# Patient Record
Sex: FEMALE
[De-identification: names, ages, dates, MRNs, and addresses within clinical notes are randomized per-mention and may not be internally consistent; named-entity substitution may affect disease eponyms.]

---

## 2018-04-19 ENCOUNTER — HOSPITAL ENCOUNTER (INPATIENT)
Dept: HOSPITAL 88 - ER | Age: 83
LOS: 4 days | Discharge: LEFT BEFORE DISCHARGE | DRG: 189 | End: 2018-04-23
Attending: INTERNAL MEDICINE | Admitting: INTERNAL MEDICINE
Payer: MEDICARE

## 2018-04-19 VITALS — SYSTOLIC BLOOD PRESSURE: 124 MMHG | DIASTOLIC BLOOD PRESSURE: 55 MMHG

## 2018-04-19 VITALS — WEIGHT: 192.02 LBS | HEIGHT: 61 IN | BODY MASS INDEX: 36.25 KG/M2

## 2018-04-19 VITALS — DIASTOLIC BLOOD PRESSURE: 68 MMHG | SYSTOLIC BLOOD PRESSURE: 135 MMHG

## 2018-04-19 DIAGNOSIS — T50.2X5A: ICD-10-CM

## 2018-04-19 DIAGNOSIS — K21.9: ICD-10-CM

## 2018-04-19 DIAGNOSIS — R00.1: ICD-10-CM

## 2018-04-19 DIAGNOSIS — J45.901: ICD-10-CM

## 2018-04-19 DIAGNOSIS — T48.6X5A: ICD-10-CM

## 2018-04-19 DIAGNOSIS — I25.10: ICD-10-CM

## 2018-04-19 DIAGNOSIS — I11.0: ICD-10-CM

## 2018-04-19 DIAGNOSIS — J20.9: ICD-10-CM

## 2018-04-19 DIAGNOSIS — J18.9: ICD-10-CM

## 2018-04-19 DIAGNOSIS — J44.0: ICD-10-CM

## 2018-04-19 DIAGNOSIS — J96.01: Primary | ICD-10-CM
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
## 2018-04-19 DIAGNOSIS — I48.91: ICD-10-CM

## 2018-04-19 DIAGNOSIS — E66.9: ICD-10-CM

## 2018-04-19 DIAGNOSIS — R53.1: ICD-10-CM

## 2018-04-19 DIAGNOSIS — D89.2: ICD-10-CM

## 2018-04-19 DIAGNOSIS — I50.9: ICD-10-CM

## 2018-04-19 LAB
ALBUMIN SERPL-MCNC: 3.6 G/DL (ref 3.5–5)
ALBUMIN/GLOB SERPL: 0.9 {RATIO} (ref 0.8–2)
ALP SERPL-CCNC: 126 IU/L (ref 40–150)
ALT SERPL-CCNC: 11 IU/L (ref 0–55)
ANION GAP SERPL CALC-SCNC: 14.8 MMOL/L (ref 8–16)
BACTERIA URNS QL MICRO: (no result) /HPF
BASOPHILS # BLD AUTO: 0.1 10*3/UL (ref 0–0.1)
BASOPHILS NFR BLD AUTO: 0.3 % (ref 0–1)
BILIRUB UR QL: NEGATIVE
BNP BLD-MCNC: 121.4 PG/ML (ref 0–100)
BUN SERPL-MCNC: 11 MG/DL (ref 7–26)
BUN/CREAT SERPL: 14 (ref 6–25)
CALCIUM SERPL-MCNC: 8.9 MG/DL (ref 8.4–10.2)
CHLORIDE SERPL-SCNC: 104 MMOL/L (ref 98–107)
CK MB SERPL-MCNC: 2.6 NG/ML (ref 0–5)
CK SERPL-CCNC: 124 IU/L (ref 29–168)
CLARITY UR: CLEAR
CO2 SERPL-SCNC: 24 MMOL/L (ref 22–29)
COLOR UR: YELLOW
DEPRECATED NEUTROPHILS # BLD AUTO: 15 10*3/UL (ref 2.1–6.9)
DEPRECATED RBC URNS MANUAL-ACNC: (no result) /HPF (ref 0–5)
EGFRCR SERPLBLD CKD-EPI 2021: > 60 ML/MIN (ref 60–?)
EOSINOPHIL # BLD AUTO: 0.1 10*3/UL (ref 0–0.4)
EOSINOPHIL NFR BLD AUTO: 0.6 % (ref 0–6)
EPI CELLS URNS QL MICRO: (no result) /LPF
ERYTHROCYTE [DISTWIDTH] IN CORD BLOOD: 13.2 % (ref 11.7–14.4)
GLOBULIN PLAS-MCNC: 4 G/DL (ref 2.3–3.5)
GLUCOSE SERPLBLD-MCNC: 169 MG/DL (ref 74–118)
HCT VFR BLD AUTO: 40 % (ref 34.2–44.1)
HGB BLD-MCNC: 13.1 G/DL (ref 12–16)
KETONES UR QL STRIP.AUTO: NEGATIVE
LEUKOCYTE ESTERASE UR QL STRIP.AUTO: NEGATIVE
LYMPHOCYTES # BLD: 0.7 10*3/UL (ref 1–3.2)
LYMPHOCYTES NFR BLD AUTO: 4.3 % (ref 18–39.1)
MCH RBC QN AUTO: 29.8 PG (ref 28–32)
MCHC RBC AUTO-ENTMCNC: 32.8 G/DL (ref 31–35)
MCV RBC AUTO: 91.1 FL (ref 81–99)
MONOCYTES # BLD AUTO: 0.7 10*3/UL (ref 0.2–0.8)
MONOCYTES NFR BLD AUTO: 4.3 % (ref 4.4–11.3)
NEUTS SEG NFR BLD AUTO: 89.9 % (ref 38.7–80)
NITRITE UR QL STRIP.AUTO: NEGATIVE
PLATELET # BLD AUTO: 224 X10E3/UL (ref 140–360)
POTASSIUM SERPL-SCNC: 3.8 MMOL/L (ref 3.5–5.1)
PROT UR QL STRIP.AUTO: (no result)
RBC # BLD AUTO: 4.39 X10E6/UL (ref 3.6–5.1)
SODIUM SERPL-SCNC: 139 MMOL/L (ref 136–145)
SP GR UR STRIP: 1.03 (ref 1.01–1.02)
UROBILINOGEN UR STRIP-MCNC: 0.2 MG/DL (ref 0.2–1)
WBC #/AREA URNS HPF: (no result) /HPF (ref 0–5)

## 2018-04-19 PROCEDURE — 96367 TX/PROPH/DG ADDL SEQ IV INF: CPT

## 2018-04-19 PROCEDURE — 84484 ASSAY OF TROPONIN QUANT: CPT

## 2018-04-19 PROCEDURE — 93306 TTE W/DOPPLER COMPLETE: CPT

## 2018-04-19 PROCEDURE — 83880 ASSAY OF NATRIURETIC PEPTIDE: CPT

## 2018-04-19 PROCEDURE — 71045 X-RAY EXAM CHEST 1 VIEW: CPT

## 2018-04-19 PROCEDURE — 86039 ANTINUCLEAR ANTIBODIES (ANA): CPT

## 2018-04-19 PROCEDURE — 82550 ASSAY OF CK (CPK): CPT

## 2018-04-19 PROCEDURE — 80053 COMPREHEN METABOLIC PANEL: CPT

## 2018-04-19 PROCEDURE — 82553 CREATINE MB FRACTION: CPT

## 2018-04-19 PROCEDURE — 81001 URINALYSIS AUTO W/SCOPE: CPT

## 2018-04-19 PROCEDURE — 5A09357 ASSISTANCE WITH RESPIRATORY VENTILATION, LESS THAN 24 CONSECUTIVE HOURS, CONTINUOUS POSITIVE AIRWAY PRESSURE: ICD-10-PCS | Performed by: INTERNAL MEDICINE

## 2018-04-19 PROCEDURE — 86431 RHEUMATOID FACTOR QUANT: CPT

## 2018-04-19 PROCEDURE — 36415 COLL VENOUS BLD VENIPUNCTURE: CPT

## 2018-04-19 PROCEDURE — 94640 AIRWAY INHALATION TREATMENT: CPT

## 2018-04-19 PROCEDURE — 94660 CPAP INITIATION&MGMT: CPT

## 2018-04-19 PROCEDURE — 93005 ELECTROCARDIOGRAM TRACING: CPT

## 2018-04-19 PROCEDURE — 99284 EMERGENCY DEPT VISIT MOD MDM: CPT

## 2018-04-19 PROCEDURE — 83605 ASSAY OF LACTIC ACID: CPT

## 2018-04-19 PROCEDURE — 71260 CT THORAX DX C+: CPT

## 2018-04-19 PROCEDURE — 85025 COMPLETE CBC W/AUTO DIFF WBC: CPT

## 2018-04-19 PROCEDURE — 80048 BASIC METABOLIC PNL TOTAL CA: CPT

## 2018-04-19 PROCEDURE — 83615 LACTATE (LD) (LDH) ENZYME: CPT

## 2018-04-19 PROCEDURE — 82785 ASSAY OF IGE: CPT

## 2018-04-19 RX ADMIN — Medication SCH ML: at 18:30

## 2018-04-19 RX ADMIN — SODIUM CHLORIDE SCH MLS/HR: 900 INJECTION, SOLUTION INTRAVENOUS at 23:00

## 2018-04-19 RX ADMIN — Medication SCH ML: at 22:30

## 2018-04-19 RX ADMIN — SODIUM CHLORIDE SCH GM: 9 INJECTION, SOLUTION INTRAVENOUS at 22:22

## 2018-04-19 NOTE — DIAGNOSTIC IMAGING REPORT
PROCEDURE:

A single AP view of the chest.

 

COMPARISON: None.

 

INDICATIONS:   SOB, COUGH 2 DAYS

     

FINDINGS:

Lines/tubes:  None.

 

Lungs: The lungs are well-inflated. The mild prominence of the 

perihilar interstitial markings. No consolidation.

 

Pleura:  There is no pleural effusion or pneumothorax.

 

Heart and mediastinum: Enlarged cardiac silhouette. Central pulmonary 

venous congestion.

 

Bones:  No acute bony abnormality.

 

IMPRESSION: 

 

1. enlarged cardiac silhouette with central pulmonary venous congestion 

and mild perihilar prominence, which may reflect mild interstitial 

edema. No consolidation or effusion. 

 

 

Ceferino Pollard M.D.  

Dictated by:  Ceferino Pollard M.D. on 4/19/2018 at 18:04     

Electronically approved by:  Ceferino Pollard M.D. on 4/19/2018 at 

18:04

## 2018-04-19 NOTE — XMS REPORT
Patient Summary Document

 Created on: 2018



DIVYA SINGLETON

External Reference #: [id number]

: 1929

Sex: Female



Demographics







 [place]  [address]

 

 Home Phone  [phone]

 

 Preferred Language  Unknown

 

 Marital Status  Unknown

 

 Orthodox Affiliation  Unknown

 

 Race  Unknown

 

 Additional Race(s)  

 

 Ethnic Group  Unknown





Author







 Author  [facility]

 

 [facility]

 

 Address  Unknown

 

 Phone  Unavailable







Care Team Providers







 Care Team Member Name  Role  Phone

 

 JENNY SHAW  Unavailable  Unavailable







Problems

This patient has no known problems.



Allergies, Adverse Reactions, Alerts

This patient has no known allergies or adverse reactions.



Medications

This patient has no known medications.



Results







 Test Description  Test Time  Test Comments  Text Results  Atomic Results  
Result Comments









 CHEST SINGLE (PORTABLE)            [place] [address]      Patient Name: DIVYA SINGLETON   MR #: [id number]    : 1929 Age/Sex: 88/F  Acct #: 
[id number] Req #: [id number]  Adm Physician:     Ordered by: JENNY SHAW MD  Report #: [id number]   Location: ER  Room/Bed:     ________________________
___________________________________________________________________________    
Procedure: [id number] DX/CHEST SINGLE (PORTABLE)  Exam Date:                    
         Exam Time:        REPORT STATUS: Signed    PROCEDURE:   A single AP 
view of the chest.       COMPARISON: None.       INDICATIONS:   SOB, COUGH 2 
DAYS           FINDINGS:   Lines/tubes:  None.       Lungs: The lungs are well-
inflated. The mild prominence of the    perihilar interstitial markings. No 
consolidation.       Pleura:  There is no pleural effusion or pneumothorax.    
   Heart and mediastinum: Enlarged cardiac silhouette. Central pulmonary    
venous congestion.       Bones:  No acute bony abnormality.       IMPRESSION:  
      1. enlarged cardiac silhouette with central pulmonary venous congestion  
  and mild perihilar prominence, which may reflect mild interstitial    edema. 
No consolidation or effusion.            Mitch Max M.D.     Dictated by
:  Mitch Max M.D. on 2018 at 18:04        Electronically approved 
by:  Mitch Max M.D. on 2018 at    18:04                Dictated By
: MITCH MAX MD  Electronically Signed By: MITCH MAX MD on 18 
180  Transcribed By: KEELEY on 18       COPY TO:   JENNY SHAW MD

## 2018-04-20 VITALS — SYSTOLIC BLOOD PRESSURE: 119 MMHG | DIASTOLIC BLOOD PRESSURE: 54 MMHG

## 2018-04-20 VITALS — SYSTOLIC BLOOD PRESSURE: 119 MMHG | DIASTOLIC BLOOD PRESSURE: 81 MMHG

## 2018-04-20 VITALS — SYSTOLIC BLOOD PRESSURE: 132 MMHG | DIASTOLIC BLOOD PRESSURE: 61 MMHG

## 2018-04-20 VITALS — DIASTOLIC BLOOD PRESSURE: 54 MMHG | SYSTOLIC BLOOD PRESSURE: 103 MMHG

## 2018-04-20 VITALS — DIASTOLIC BLOOD PRESSURE: 64 MMHG | SYSTOLIC BLOOD PRESSURE: 124 MMHG

## 2018-04-20 VITALS — DIASTOLIC BLOOD PRESSURE: 70 MMHG | SYSTOLIC BLOOD PRESSURE: 141 MMHG

## 2018-04-20 VITALS — DIASTOLIC BLOOD PRESSURE: 65 MMHG | SYSTOLIC BLOOD PRESSURE: 143 MMHG

## 2018-04-20 VITALS — SYSTOLIC BLOOD PRESSURE: 114 MMHG | DIASTOLIC BLOOD PRESSURE: 63 MMHG

## 2018-04-20 VITALS — SYSTOLIC BLOOD PRESSURE: 133 MMHG | DIASTOLIC BLOOD PRESSURE: 69 MMHG

## 2018-04-20 VITALS — DIASTOLIC BLOOD PRESSURE: 64 MMHG | SYSTOLIC BLOOD PRESSURE: 134 MMHG

## 2018-04-20 VITALS — DIASTOLIC BLOOD PRESSURE: 61 MMHG | SYSTOLIC BLOOD PRESSURE: 141 MMHG

## 2018-04-20 VITALS — DIASTOLIC BLOOD PRESSURE: 60 MMHG | SYSTOLIC BLOOD PRESSURE: 116 MMHG

## 2018-04-20 VITALS — DIASTOLIC BLOOD PRESSURE: 56 MMHG | SYSTOLIC BLOOD PRESSURE: 116 MMHG

## 2018-04-20 VITALS — SYSTOLIC BLOOD PRESSURE: 120 MMHG | DIASTOLIC BLOOD PRESSURE: 57 MMHG

## 2018-04-20 VITALS — DIASTOLIC BLOOD PRESSURE: 57 MMHG | SYSTOLIC BLOOD PRESSURE: 150 MMHG

## 2018-04-20 VITALS — SYSTOLIC BLOOD PRESSURE: 140 MMHG | DIASTOLIC BLOOD PRESSURE: 51 MMHG

## 2018-04-20 VITALS — DIASTOLIC BLOOD PRESSURE: 57 MMHG | SYSTOLIC BLOOD PRESSURE: 141 MMHG

## 2018-04-20 VITALS — SYSTOLIC BLOOD PRESSURE: 128 MMHG | DIASTOLIC BLOOD PRESSURE: 66 MMHG

## 2018-04-20 VITALS — DIASTOLIC BLOOD PRESSURE: 58 MMHG | SYSTOLIC BLOOD PRESSURE: 123 MMHG

## 2018-04-20 VITALS — DIASTOLIC BLOOD PRESSURE: 48 MMHG | SYSTOLIC BLOOD PRESSURE: 115 MMHG

## 2018-04-20 VITALS — DIASTOLIC BLOOD PRESSURE: 72 MMHG | SYSTOLIC BLOOD PRESSURE: 152 MMHG

## 2018-04-20 VITALS — SYSTOLIC BLOOD PRESSURE: 110 MMHG | DIASTOLIC BLOOD PRESSURE: 49 MMHG

## 2018-04-20 VITALS — SYSTOLIC BLOOD PRESSURE: 146 MMHG | DIASTOLIC BLOOD PRESSURE: 64 MMHG

## 2018-04-20 VITALS — DIASTOLIC BLOOD PRESSURE: 54 MMHG | SYSTOLIC BLOOD PRESSURE: 123 MMHG

## 2018-04-20 VITALS — SYSTOLIC BLOOD PRESSURE: 134 MMHG | DIASTOLIC BLOOD PRESSURE: 64 MMHG

## 2018-04-20 VITALS — DIASTOLIC BLOOD PRESSURE: 51 MMHG | SYSTOLIC BLOOD PRESSURE: 119 MMHG

## 2018-04-20 VITALS — DIASTOLIC BLOOD PRESSURE: 55 MMHG | SYSTOLIC BLOOD PRESSURE: 124 MMHG

## 2018-04-20 VITALS — SYSTOLIC BLOOD PRESSURE: 118 MMHG | DIASTOLIC BLOOD PRESSURE: 66 MMHG

## 2018-04-20 VITALS — DIASTOLIC BLOOD PRESSURE: 56 MMHG | SYSTOLIC BLOOD PRESSURE: 119 MMHG

## 2018-04-20 VITALS — DIASTOLIC BLOOD PRESSURE: 52 MMHG | SYSTOLIC BLOOD PRESSURE: 115 MMHG

## 2018-04-20 VITALS — DIASTOLIC BLOOD PRESSURE: 69 MMHG | SYSTOLIC BLOOD PRESSURE: 128 MMHG

## 2018-04-20 VITALS — SYSTOLIC BLOOD PRESSURE: 100 MMHG | DIASTOLIC BLOOD PRESSURE: 58 MMHG

## 2018-04-20 VITALS — SYSTOLIC BLOOD PRESSURE: 148 MMHG | DIASTOLIC BLOOD PRESSURE: 60 MMHG

## 2018-04-20 VITALS — DIASTOLIC BLOOD PRESSURE: 62 MMHG | SYSTOLIC BLOOD PRESSURE: 139 MMHG

## 2018-04-20 VITALS — SYSTOLIC BLOOD PRESSURE: 99 MMHG | DIASTOLIC BLOOD PRESSURE: 53 MMHG

## 2018-04-20 VITALS — SYSTOLIC BLOOD PRESSURE: 102 MMHG | DIASTOLIC BLOOD PRESSURE: 67 MMHG

## 2018-04-20 VITALS — SYSTOLIC BLOOD PRESSURE: 117 MMHG | DIASTOLIC BLOOD PRESSURE: 54 MMHG

## 2018-04-20 VITALS — SYSTOLIC BLOOD PRESSURE: 110 MMHG | DIASTOLIC BLOOD PRESSURE: 54 MMHG

## 2018-04-20 VITALS — DIASTOLIC BLOOD PRESSURE: 71 MMHG | SYSTOLIC BLOOD PRESSURE: 134 MMHG

## 2018-04-20 VITALS — SYSTOLIC BLOOD PRESSURE: 119 MMHG | DIASTOLIC BLOOD PRESSURE: 57 MMHG

## 2018-04-20 VITALS — DIASTOLIC BLOOD PRESSURE: 54 MMHG | SYSTOLIC BLOOD PRESSURE: 111 MMHG

## 2018-04-20 VITALS — DIASTOLIC BLOOD PRESSURE: 55 MMHG | SYSTOLIC BLOOD PRESSURE: 108 MMHG

## 2018-04-20 VITALS — DIASTOLIC BLOOD PRESSURE: 60 MMHG | SYSTOLIC BLOOD PRESSURE: 115 MMHG

## 2018-04-20 VITALS — SYSTOLIC BLOOD PRESSURE: 130 MMHG | DIASTOLIC BLOOD PRESSURE: 62 MMHG

## 2018-04-20 VITALS — DIASTOLIC BLOOD PRESSURE: 56 MMHG | SYSTOLIC BLOOD PRESSURE: 130 MMHG

## 2018-04-20 VITALS — DIASTOLIC BLOOD PRESSURE: 59 MMHG | SYSTOLIC BLOOD PRESSURE: 135 MMHG

## 2018-04-20 LAB
ANION GAP SERPL CALC-SCNC: 14.6 MMOL/L (ref 8–16)
BASOPHILS # BLD AUTO: 0 10*3/UL (ref 0–0.1)
BASOPHILS NFR BLD AUTO: 0.3 % (ref 0–1)
BUN SERPL-MCNC: 14 MG/DL (ref 7–26)
BUN/CREAT SERPL: 16 (ref 6–25)
CALCIUM SERPL-MCNC: 8.7 MG/DL (ref 8.4–10.2)
CHLORIDE SERPL-SCNC: 103 MMOL/L (ref 98–107)
CK MB SERPL-MCNC: 3.5 NG/ML (ref 0–5)
CK MB SERPL-MCNC: 4.2 NG/ML (ref 0–5)
CK SERPL-CCNC: 160 IU/L (ref 29–168)
CK SERPL-CCNC: 176 IU/L (ref 29–168)
CO2 SERPL-SCNC: 27 MMOL/L (ref 22–29)
DEPRECATED NEUTROPHILS # BLD AUTO: 9.5 10*3/UL (ref 2.1–6.9)
EGFRCR SERPLBLD CKD-EPI 2021: > 60 ML/MIN (ref 60–?)
EOSINOPHIL # BLD AUTO: 0.2 10*3/UL (ref 0–0.4)
EOSINOPHIL NFR BLD AUTO: 1.5 % (ref 0–6)
ERYTHROCYTE [DISTWIDTH] IN CORD BLOOD: 13.5 % (ref 11.7–14.4)
GLUCOSE SERPLBLD-MCNC: 141 MG/DL (ref 74–118)
HCT VFR BLD AUTO: 36.2 % (ref 34.2–44.1)
HGB BLD-MCNC: 11.9 G/DL (ref 12–16)
LYMPHOCYTES # BLD: 0.8 10*3/UL (ref 1–3.2)
LYMPHOCYTES NFR BLD AUTO: 7.2 % (ref 18–39.1)
MCH RBC QN AUTO: 30.4 PG (ref 28–32)
MCHC RBC AUTO-ENTMCNC: 32.9 G/DL (ref 31–35)
MCV RBC AUTO: 92.6 FL (ref 81–99)
MONOCYTES # BLD AUTO: 0.9 10*3/UL (ref 0.2–0.8)
MONOCYTES NFR BLD AUTO: 8.1 % (ref 4.4–11.3)
NEUTS SEG NFR BLD AUTO: 82.6 % (ref 38.7–80)
PLATELET # BLD AUTO: 217 X10E3/UL (ref 140–360)
POTASSIUM SERPL-SCNC: 3.6 MMOL/L (ref 3.5–5.1)
RBC # BLD AUTO: 3.91 X10E6/UL (ref 3.6–5.1)
SODIUM SERPL-SCNC: 141 MMOL/L (ref 136–145)

## 2018-04-20 RX ADMIN — Medication SCH ML: at 07:50

## 2018-04-20 RX ADMIN — ENOXAPARIN SODIUM SCH MG: 40 INJECTION SUBCUTANEOUS at 16:58

## 2018-04-20 RX ADMIN — AMLODIPINE BESYLATE SCH MG: 5 TABLET ORAL at 10:22

## 2018-04-20 RX ADMIN — Medication SCH ML: at 11:31

## 2018-04-20 RX ADMIN — Medication SCH ML: at 15:02

## 2018-04-20 RX ADMIN — SODIUM CHLORIDE SCH GM: 9 INJECTION, SOLUTION INTRAVENOUS at 08:58

## 2018-04-20 RX ADMIN — Medication SCH ML: at 03:33

## 2018-04-20 RX ADMIN — Medication SCH ML: at 03:32

## 2018-04-20 RX ADMIN — SODIUM CHLORIDE SCH GM: 9 INJECTION, SOLUTION INTRAVENOUS at 19:55

## 2018-04-20 RX ADMIN — Medication SCH ML: at 19:00

## 2018-04-20 RX ADMIN — METHYLPREDNISOLONE SODIUM SUCCINATE SCH MG: 40 INJECTION, POWDER, LYOPHILIZED, FOR SOLUTION INTRAMUSCULAR; INTRAVENOUS at 20:14

## 2018-04-20 RX ADMIN — SODIUM CHLORIDE SCH MLS/HR: 900 INJECTION, SOLUTION INTRAVENOUS at 19:55

## 2018-04-20 NOTE — CARDIOLOGY REPORT
DATE OF STUDY:    



ATTENDING PHYSICIAN:  Dr. Edil Carlos



ECHOCARDIOGRAM  



M-MODE:  Normal chamber sizes.  Borderline left ventricular hypertrophy.  

Normal contractility.  Normal mitral and aortic valves.  No pericardial 

effusion.



SECTOR SCAN:  Borderline left ventricular hypertrophy.  Normal 

contractility.  Ejection fraction 65%.  Chamber size normal.  Mitral, 

aortic and tricuspid valves are grossly normal.  Minimal posterior 

pericardial effusion in the apex measuring approximately 0.4 cm with 

prominent epicardial fat pad. 



CARDIAC DOPPLER STUDY WITH COLOR:  Trace tricuspid and mitral 

regurgitation.  Pulmonary artery systolic pressure estimated at 23 mmHg.



CONCLUSIONS 

1.  Minimal apical pericardial effusion measuring 0.4 cm with prominent 

epicardial fat pad.  

2.  Borderline left ventricular hypertrophy with ejection fraction of 65%.

3.  Trace tricuspid regurgitation without significant pulmonary 

hypertension.

4.  Trace mitral regurgitation.  









DD:  04/20/2018 08:54

DT:  04/20/2018 08:59

Job#:  [id number]

## 2018-04-20 NOTE — CONSULTATION
PULMONARY MEDICINE CONSULT



DATE OF CONSULTATION:  April 20, 2018 



HISTORY OF PRESENT ILLNESS:  Ms. Rubio is a pleasant 88-year-old female 

with hypoxemia.  Patient was admitted under Dr. Camejo and I am asked to 

evaluate.  Patient was having shortness of breath, onset for three days.  

Patient has sick contacts times two at home including her son-in-law and a 

grandchild.  Patient was not having any fevers.  She was having mild 

secretions.  This is the second event similar to this as she had an event 

similar about five months ago although was not as severe.



Prior to this, there was no history of any asthma.  No allergies.  No 

significant GERD.  She ambulates mostly without any assistive devices 

although she did get assistive devices in the past.  She still will walk 

around in the grocery store and to go shopping.  No history of home oxygen 

use.



While in the hospital she was transferred to another bed.  Her oxygen 

saturation went down to 75% on 5 liters per minute of oxygen.  She was 

given rescue BiPAP and moved to the ICU.  CT angiography at that time 

showed no PE with 28 mm pulmonary artery diameter with minimally elevated 

left hemidiaphragm and some scant basilar ground glass more suggestive of 

atelectasis pattern.



PAST MEDICAL HISTORY:  Hypertension.



MEDICATIONS:  Patient is on bisoprolol.



ALLERGIES:  NO KNOWN DRUG ALLERGIES.



SOCIAL HISTORY:  No smoking, no drinking, no drugs.  [place]  She was 10 years old when the family converted 

away from wood-burning stoves.  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 

wood stove.  She got  at age 26 and moved to [facility] and then 

eventually to the [place].  Currently she is living between two of 

her daughters as her  passed away in December 2017.



FAMILY HISTORY:  Noncontributory.



REVIEW OF SYSTEMS

GENERAL:  No weight changes.

OPHTHALMOLOGIC:  No double vision.

ENT:  No bloody noses.

ENDOCRINE:  No thyroid disease.

PULMONARY:  No blood in the coughs.

CARDIAC:  No heart attacks.

:  No blood in the urine.

GI:  No diarrhea.

MUSCULOSKELETAL:  There is mild arthritis mainly in the knees but she never 

needs to take medicine for it.

NEUROLOGIC:  No seizures.

PSYCHIATRIC:  No depression.



OBJECTIVE

VITAL SIGNS:  Afebrile, vital signs noted per electronic record.

GENERAL:  No acute distress, alert and calm.

HEENT:  Normocephalic, atraumatic.

NECK:  Supple.  Throat midline.

LUNGS:  Bilateral air entry, rare rhonchi, mostly clear.

CARDIOVASCULAR:  S1, S2.  No murmurs, rubs, or gallops.

ABDOMEN:  Soft, nontender.

EXTREMITIES:  No clubbing, no cyanosis, there is no edema.

INTEGUMENT:  No rash.  No purpura.



LABS:  Labs reviewed per electronic record.  Include 11 white count, 36 

hematocrit, 217 platelets, 3.6 potassium, 14 BUN, 0.9 creatinine.  BMP was 

121.  Globulin 4.0 and albumin 3.6.



IMPRESSION AND PLAN

1. Abnormal chest radiography, scant ground glass.  Treat for possible 

pneumonia.

2. Acute hypoxemic respiratory failure, status post transient BiPAP and 

now off BiPAP.

3. Obesity.

4. Treat for possible asthma.

5. History of biofuel exposures in her youth.

6. 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



Treat for possible asthma with steroids and bronchodilators.  Check IgE 

level given the lifelong absent of asthma history.  She will need 

outpatient PFTs.  Follow up and wean down the oxygen as tolerated.  If 

there is trouble getting her off the oxygen we should consider shunt study 

including echo with contrast if needed.  Hopefully patient continues to 

improve.  Lung expansion would be important given her obesity and the 

conformation of the elevated left hemidiaphragm which is very mild but may 

suggest some interaction from obesity.  Will follow along closely.  Give 

DVT prophylaxis in the meantime.



Thank you very much Dr. Camejo for allowing me a chance to participate in 

the care of Ms. Rubio.  Do not hesitate to contact me if I can help in 

any way.

 



 



DD:  04/20/2018 14:01

DT:  04/20/2018 14:46

Job#:  [id number] PAUL

## 2018-04-20 NOTE — DIAGNOSTIC IMAGING REPORT
CHEST SINGLE (PORTABLE), 4/20/2018 5:00 AM



Technique: CHEST SINGLE (PORTABLE)

Comparison: Previous day

Clinical history: Pneumonia



Findings:

See Impression



Impression:

1. Stable mildly enlarged cardiomediastinal silhouette.

2. Minimal bibasilar linear opacities, favor atelectasis/vascular crowding.

3. No pleural effusion or pneumothorax.



Signed by: Dr Dottie Walker MD on 4/20/2018 6:20 AM

## 2018-04-20 NOTE — DIAGNOSTIC IMAGING REPORT
PROCEDURE:

CT scan of the chest  WITH intravenous contrast, using pulmonary 

embolus protocol.

 

TECHNIQUE:

The chest was scanned utilizing a multidetector helical scanner from 

the lung apex through the level of the adrenal glands after the IV 

administration of 100 cc of Isovue 370.  Coronal and sagittal 

multiplanar reformations were obtained.

 

COMPARISON: Chest radiograph 4/20/2018.

 

INDICATIONS:  PULMONARY EMBOLISM

    

FINDINGS:

Vasculature: The main pulmonary artery, right and left pulmonary 

arteries, and their visualized lobar and segmental branches are patent, 

without filling defect. The pulmonary outflow tract is of normal 

caliber. There is no ectasia or aneurysmal dilatation of the thoracic 

aorta. Incidental note of a common origin of the innominate artery and 

left common carotid artery from the aortic arch. Great vessel origins 

are otherwise normal in caliber and configuration. Atherosclerotic 

calcification of the aorta and left anterior descending coronary 

artery.

Lungs and Airways: Minimal patchy groundglass and reticular opacities 

in the dependent portions of the lower lobes compatible with 

subsegmental atelectasis. Bandlike atelectasis or scar in the lingula. 

No airspace consolidation, bronchiectasis, or suspicious mass lesion.

 

Pleura: No pleural effusion or pneumothorax.

 

Heart and mediastinum: The visualized portions of the thyroid gland 

appear normal. No axillary, hilar, or mediastinal lymphadenopathy. Mild 

cardiomegaly without pericardial effusion. No septal bowing or right 

ventricular dilatation.

 

Soft tissues: No focal soft tissue abnormalities.

 

Abdomen: Visualized portions of the liver, spleen, pancreas, adrenals, 

and kidneys are unremarkable.

 

Bones: No osseous destructive lesions. Multilevel degenerative disc 

changes.

 

IMPRESSION: 

 

No pulmonary embolus to the level of the segmental branch pulmonary 

arteries.

 

Cardiomegaly without overt pulmonary edema.

 

Atherosclerotic vascular disease.

 

 

Dictated by:  Pedro Luis Crespo M.D. on 4/20/2018 at 10:13     

Electronically approved by:  Pedro Luis Crespo M.D. on 4/20/2018 at 10:13

## 2018-04-21 VITALS — SYSTOLIC BLOOD PRESSURE: 129 MMHG | DIASTOLIC BLOOD PRESSURE: 56 MMHG

## 2018-04-21 VITALS — DIASTOLIC BLOOD PRESSURE: 53 MMHG | SYSTOLIC BLOOD PRESSURE: 105 MMHG

## 2018-04-21 VITALS — DIASTOLIC BLOOD PRESSURE: 51 MMHG | SYSTOLIC BLOOD PRESSURE: 109 MMHG

## 2018-04-21 VITALS — DIASTOLIC BLOOD PRESSURE: 66 MMHG | SYSTOLIC BLOOD PRESSURE: 133 MMHG

## 2018-04-21 VITALS — SYSTOLIC BLOOD PRESSURE: 101 MMHG | DIASTOLIC BLOOD PRESSURE: 37 MMHG

## 2018-04-21 VITALS — SYSTOLIC BLOOD PRESSURE: 113 MMHG | DIASTOLIC BLOOD PRESSURE: 50 MMHG

## 2018-04-21 VITALS — SYSTOLIC BLOOD PRESSURE: 137 MMHG | DIASTOLIC BLOOD PRESSURE: 58 MMHG

## 2018-04-21 VITALS — SYSTOLIC BLOOD PRESSURE: 106 MMHG | DIASTOLIC BLOOD PRESSURE: 40 MMHG

## 2018-04-21 VITALS — DIASTOLIC BLOOD PRESSURE: 51 MMHG | SYSTOLIC BLOOD PRESSURE: 112 MMHG

## 2018-04-21 VITALS — SYSTOLIC BLOOD PRESSURE: 132 MMHG | DIASTOLIC BLOOD PRESSURE: 54 MMHG

## 2018-04-21 VITALS — SYSTOLIC BLOOD PRESSURE: 130 MMHG | DIASTOLIC BLOOD PRESSURE: 56 MMHG

## 2018-04-21 VITALS — DIASTOLIC BLOOD PRESSURE: 55 MMHG | SYSTOLIC BLOOD PRESSURE: 131 MMHG

## 2018-04-21 VITALS — DIASTOLIC BLOOD PRESSURE: 61 MMHG | SYSTOLIC BLOOD PRESSURE: 131 MMHG

## 2018-04-21 VITALS — DIASTOLIC BLOOD PRESSURE: 53 MMHG | SYSTOLIC BLOOD PRESSURE: 122 MMHG

## 2018-04-21 VITALS — SYSTOLIC BLOOD PRESSURE: 114 MMHG | DIASTOLIC BLOOD PRESSURE: 62 MMHG

## 2018-04-21 VITALS — DIASTOLIC BLOOD PRESSURE: 56 MMHG | SYSTOLIC BLOOD PRESSURE: 131 MMHG

## 2018-04-21 VITALS — SYSTOLIC BLOOD PRESSURE: 118 MMHG | DIASTOLIC BLOOD PRESSURE: 61 MMHG

## 2018-04-21 VITALS — DIASTOLIC BLOOD PRESSURE: 52 MMHG | SYSTOLIC BLOOD PRESSURE: 112 MMHG

## 2018-04-21 VITALS — DIASTOLIC BLOOD PRESSURE: 68 MMHG | SYSTOLIC BLOOD PRESSURE: 151 MMHG

## 2018-04-21 VITALS — DIASTOLIC BLOOD PRESSURE: 55 MMHG | SYSTOLIC BLOOD PRESSURE: 125 MMHG

## 2018-04-21 VITALS — DIASTOLIC BLOOD PRESSURE: 58 MMHG | SYSTOLIC BLOOD PRESSURE: 124 MMHG

## 2018-04-21 VITALS — SYSTOLIC BLOOD PRESSURE: 127 MMHG | DIASTOLIC BLOOD PRESSURE: 57 MMHG

## 2018-04-21 VITALS — SYSTOLIC BLOOD PRESSURE: 126 MMHG | DIASTOLIC BLOOD PRESSURE: 51 MMHG

## 2018-04-21 VITALS — SYSTOLIC BLOOD PRESSURE: 131 MMHG | DIASTOLIC BLOOD PRESSURE: 55 MMHG

## 2018-04-21 VITALS — SYSTOLIC BLOOD PRESSURE: 125 MMHG | DIASTOLIC BLOOD PRESSURE: 61 MMHG

## 2018-04-21 VITALS — SYSTOLIC BLOOD PRESSURE: 138 MMHG | DIASTOLIC BLOOD PRESSURE: 64 MMHG

## 2018-04-21 VITALS — SYSTOLIC BLOOD PRESSURE: 126 MMHG | DIASTOLIC BLOOD PRESSURE: 56 MMHG

## 2018-04-21 VITALS — SYSTOLIC BLOOD PRESSURE: 126 MMHG | DIASTOLIC BLOOD PRESSURE: 54 MMHG

## 2018-04-21 VITALS — SYSTOLIC BLOOD PRESSURE: 142 MMHG | DIASTOLIC BLOOD PRESSURE: 60 MMHG

## 2018-04-21 VITALS — DIASTOLIC BLOOD PRESSURE: 59 MMHG | SYSTOLIC BLOOD PRESSURE: 129 MMHG

## 2018-04-21 VITALS — SYSTOLIC BLOOD PRESSURE: 136 MMHG | DIASTOLIC BLOOD PRESSURE: 71 MMHG

## 2018-04-21 VITALS — DIASTOLIC BLOOD PRESSURE: 57 MMHG | SYSTOLIC BLOOD PRESSURE: 134 MMHG

## 2018-04-21 VITALS — DIASTOLIC BLOOD PRESSURE: 54 MMHG | SYSTOLIC BLOOD PRESSURE: 128 MMHG

## 2018-04-21 VITALS — SYSTOLIC BLOOD PRESSURE: 127 MMHG | DIASTOLIC BLOOD PRESSURE: 65 MMHG

## 2018-04-21 RX ADMIN — METHYLPREDNISOLONE SODIUM SUCCINATE SCH MG: 40 INJECTION, POWDER, LYOPHILIZED, FOR SOLUTION INTRAMUSCULAR; INTRAVENOUS at 08:36

## 2018-04-21 RX ADMIN — Medication SCH ML: at 14:02

## 2018-04-21 RX ADMIN — Medication SCH ML: at 19:10

## 2018-04-21 RX ADMIN — Medication SCH ML: at 02:25

## 2018-04-21 RX ADMIN — Medication SCH ML: at 07:30

## 2018-04-21 RX ADMIN — SODIUM CHLORIDE SCH MLS/HR: 900 INJECTION, SOLUTION INTRAVENOUS at 20:47

## 2018-04-21 RX ADMIN — SODIUM CHLORIDE SCH GM: 9 INJECTION, SOLUTION INTRAVENOUS at 20:47

## 2018-04-21 RX ADMIN — ENOXAPARIN SODIUM SCH MG: 40 INJECTION SUBCUTANEOUS at 17:31

## 2018-04-21 RX ADMIN — AMLODIPINE BESYLATE SCH MG: 5 TABLET ORAL at 08:36

## 2018-04-21 RX ADMIN — SODIUM CHLORIDE SCH GM: 9 INJECTION, SOLUTION INTRAVENOUS at 08:36

## 2018-04-21 NOTE — PROGRESS NOTE
DATE:  April 21, 2018 



PULMONARY MEDICINE PROGRESS NOTE 



SUBJECTIVE:  Ms. Rubio was seen and examined at bedside.  She continues 

to have significant improvement in her breathing.  White count is 

decreasing.  Had 1.0 L in and 0.4 L out recorded.  Two bowel movements.  

She is eating well.  Has 96% oxygen saturation on 4 L per minute by nasal 

cannula oxygen.



REVIEW OF SYSTEMS:  No double vision.  No rash.



OBJECTIVE

VITAL SIGNS:  Afebrile, vital signs noted per electronic record.

GENERAL:  No acute distress, alert and calm.

HEENT:  Normocephalic, atraumatic.

NECK:  Supple.  Throat midline.

LUNGS:  Bilateral air entry, rare rhonchi.

CARDIOVASCULAR:  S1, S2.  No murmurs, rubs or gallops.

ABDOMEN:  Soft, nontender.

EXTREMITIES:  No clubbing.  No cyanosis.  There is no edema.

INTEGUMENT:  No rash.  No purpura.



LABS:  White count 11, 36 hematocrit, 217 platelets, 3.6 potassium, 14 BUN, 

0.9 creatinine. 



IMPRESSION AND PLAN

1. Acute respiratory failure, resolving, hypoxemic.

2. Asthma with exacerbation.

3. Atelectasis versus pneumonia, basilar lung opacities, mild.

4. Obesity.





Continue IV antibiotics for now.  Wean steroids.  DVT prophylaxis.  Follow 

up IgE, rheumatoid factor and PATRICK.  Consider alpha-1 antitrypsin testing.  









DD:  04/21/2018 13:36

DT:  04/21/2018 14:09

Job#:  [id number]

## 2018-04-22 VITALS — SYSTOLIC BLOOD PRESSURE: 147 MMHG | DIASTOLIC BLOOD PRESSURE: 65 MMHG

## 2018-04-22 VITALS — DIASTOLIC BLOOD PRESSURE: 57 MMHG | SYSTOLIC BLOOD PRESSURE: 119 MMHG

## 2018-04-22 VITALS — DIASTOLIC BLOOD PRESSURE: 56 MMHG | SYSTOLIC BLOOD PRESSURE: 117 MMHG

## 2018-04-22 VITALS — SYSTOLIC BLOOD PRESSURE: 158 MMHG | DIASTOLIC BLOOD PRESSURE: 72 MMHG

## 2018-04-22 VITALS — SYSTOLIC BLOOD PRESSURE: 161 MMHG | DIASTOLIC BLOOD PRESSURE: 71 MMHG

## 2018-04-22 VITALS — DIASTOLIC BLOOD PRESSURE: 70 MMHG | SYSTOLIC BLOOD PRESSURE: 160 MMHG

## 2018-04-22 RX ADMIN — Medication SCH ML: at 07:00

## 2018-04-22 RX ADMIN — SODIUM CHLORIDE SCH GM: 9 INJECTION, SOLUTION INTRAVENOUS at 20:29

## 2018-04-22 RX ADMIN — PREDNISONE SCH MG: 20 TABLET ORAL at 09:22

## 2018-04-22 RX ADMIN — SODIUM CHLORIDE SCH GM: 9 INJECTION, SOLUTION INTRAVENOUS at 08:35

## 2018-04-22 RX ADMIN — Medication SCH ML: at 12:40

## 2018-04-22 RX ADMIN — Medication SCH ML: at 00:00

## 2018-04-22 RX ADMIN — AMLODIPINE BESYLATE SCH MG: 5 TABLET ORAL at 09:21

## 2018-04-22 RX ADMIN — Medication SCH ML: at 19:08

## 2018-04-22 RX ADMIN — SODIUM CHLORIDE SCH MLS/HR: 900 INJECTION, SOLUTION INTRAVENOUS at 20:29

## 2018-04-22 RX ADMIN — ENOXAPARIN SODIUM SCH MG: 40 INJECTION SUBCUTANEOUS at 17:22

## 2018-04-22 NOTE — HISTORY AND PHYSICAL
CLINICAL HISTORY:  This is an 88-year-old  woman admitted via 

the emergency room because of acute respiratory distress with desaturation.



According to the patient, she has had a cough and severe shortness of 

breath for 3 days.  She finally decided to go see her family doctor, and 

was referred to the emergency room for hospitalization.  In the emergency 

room, she had an x-ray showing mild congestion.  She was treated with 

bronchodilators, but was unable to tolerate CPAP machine.  Became quickly 

desaturated to the 70s.  Eventually, stabilized after multiple attempts at 

wearing the BiPAP machine and with diuresis.



According to the patient, she has hypertension and is taking Micardis.  She 

denies any allergic reaction to Micardis in the past.  She does have a 

chronic cough.  



PAST MEDICAL HISTORY:  She denies any diabetes or hyperlipidemia.  



PAST SURGERIES:  Included cholecystectomy and hysterectomy.



PERSONAL AND SOCIAL HISTORY:  Denies smoking or drinking.  She is a 

housewife.



REVIEW OF SYSTEMS:  Noncontributory. 



PHYSICAL EXAMINATION

GENERAL:  She is alert, coherent and appears to be short of breath even 

with talking. 

CARDIAC:  Jugular veins were not distended.  S1 and S2 were regular.  There 

is no appreciable murmurs. 

LUNGS:  Shows expiratory wheezes. 

ABDOMEN:  Soft.  Bowel sounds are present. 

EXTREMITIES:  Show no cyanosis, clubbing or edema. 



LABORATORY STUDIES:  The chest x-ray showed pulmonary congestion.  

Echocardiogram, however, showed normal ejection fraction of 65% with 

minimal pericardial effusion without pulmonary hypertension and without 

significant valvular abnormalities.

IMPRESSION

1.  Shortness of breath and cough of undetermined etiology with no family 

members being sick:  Consider upper respiratory infection, but also 

consider allergic reaction to Micardis.

2.  Severe hypoxemia with chest x-ray showing some congestion, but with 

normal left ventricular ejection fraction:  Consider pulmonary embolism.

3.  Hypertension.  

4.  Obesity, possibly confusing the x-ray interpretation.



RECOMMENDATIONS:  CT scan of the chest to rule out pulmonary embolism, as 

well as further delineate pulmonary anatomy.  This patient has no previous 

history of smoking.



Pulmonary consultation may be helpful.  Continue bronchodilators and 

diuretics.  Consider steroid inhalers.  Consider IV steroids.









DD:  04/20/2018 09:19

DT:  04/20/2018 09:27

Job#:  [id number]



cc:MOSES BOYER MD

## 2018-04-22 NOTE — PROGRESS NOTE
DATE:  April 22, 2018 



PULMONARY MEDICINE PROGRESS NOTE



SUBJECTIVE:  Ms. Rubio was seen and examined at bedside.  She continues 

to have steady improvement.  She was moved out of the ICU and today is on 

the medical floor.  She is sitting up.  Oxygen saturation 94% and only on 2 

L per minute nasal cannula.  We did wean off oxygen.  The patient has not 

had subjective response to bronchodilators.



REVIEW OF SYSTEMS:  No headaches.  No bleeding.



OBJECTIVE 

VITALS:  Afebrile.  Vital signs noted per electronic record.

GENERAL:  In no acute distress.  Alert and calm.

HEENT:  Normocephalic and atraumatic.  

NECK:  Supple.  Throat midline.

LUNGS:  Bilateral air entry.  Few rare rhonchi.  Mostly clear.

CARDIOVASCULAR:  S1 and S2.  No murmurs, rubs or gallops.

ABDOMEN:  Soft and nontender. 

EXTREMITIES:  No clubbing.  No cyanosis.  There is no edema.

INTEGUMENT:  No rash.  No purpura.  



LABS:  Reviewed per electronic record.



IMPRESSION AND PLAN 

1.  Acute bronchitis.

2.  Possible pneumonia with ground-glass per computerized tomography of 

chest.

3.  Treat for possible asthma with exacerbation.

4.  Hypoxemia, improving.

5.  Possible gastroesophageal reflux disease although would be 

subclinical/silent as the patient is without symptoms.



Continue current treatment at this time.  The patient needs further 

followup to see if there are symptoms of GERD that come up.  Otherwise, 

will just treat her blindly.  Follow up IgE level.  Follow up PFTs as an 

outpatient.  Continue antibiotics and steroids for now.  







DD:  04/22/2018 13:41

DT:  04/22/2018 14:17

Job#:  [id number]

## 2018-04-23 VITALS — SYSTOLIC BLOOD PRESSURE: 143 MMHG | DIASTOLIC BLOOD PRESSURE: 63 MMHG

## 2018-04-23 VITALS — DIASTOLIC BLOOD PRESSURE: 58 MMHG | SYSTOLIC BLOOD PRESSURE: 128 MMHG

## 2018-04-23 VITALS — DIASTOLIC BLOOD PRESSURE: 67 MMHG | SYSTOLIC BLOOD PRESSURE: 152 MMHG

## 2018-04-23 VITALS — SYSTOLIC BLOOD PRESSURE: 152 MMHG | DIASTOLIC BLOOD PRESSURE: 67 MMHG

## 2018-04-23 RX ADMIN — PREDNISONE SCH MG: 20 TABLET ORAL at 08:20

## 2018-04-23 RX ADMIN — AMLODIPINE BESYLATE SCH MG: 5 TABLET ORAL at 08:20

## 2018-04-23 RX ADMIN — Medication SCH ML: at 07:00

## 2018-04-23 RX ADMIN — Medication SCH ML: at 00:28

## 2018-04-23 RX ADMIN — SODIUM CHLORIDE SCH GM: 9 INJECTION, SOLUTION INTRAVENOUS at 08:20

## 2018-04-23 NOTE — PROGRESS NOTE
DATE:  April 23, 2018 



PULMONARY MEDICINE PROGRESS NOTE



SUBJECTIVE:  Mrs. Ramiro Aguayo is making steady improvement. She is able 

to walk around today. She used a walker. She was able to go independently, 

she says. There is only small _______ that she encountered. Patient 

continues at this time with less cough.



REVIEW OF SYSTEMS:  No bleeding, no rash.



OBJECTIVE

VITAL SIGNS:  Afebrile. Vital signs noted per electronic record.

GENERALLY:  No acute distress, alert and calm. 

HEENT:  Normocephalic, atraumatic.

NECK:  Supple. Throat midline.

LUNGS:  Bilateral air entry, a few rhonchi.

CARDIOVASCULAR:  S1 and S2. No murmurs, rubs or gallops.

ABDOMINAL:  Soft, nontender.

EXTREMITIES:  No clubbing, no cyanosis. There is no edema.

INTEGUMENT:  No rash. No purpura.



IMPRESSION AND PLAN

1. Asthma with exacerbation, presumptive.

2. Acute bronchitis.

3. Hypoxemia and respiratory failure, resolved.

4. Weakness.





Continue current treatment. Continue steroid taper. At this time will 

continue antibiotics and finish up as outpatient. Mobilize the patient. DVT 

prophylaxis will be continued.











DD:  04/23/2018 12:19

DT:  04/23/2018 12:22

Job#:  [id number] MARISELA

## 2018-04-24 NOTE — DISCHARGE SUMMARY
CLINICAL HISTORY:  This is 88-year-old  woman admitted via 

the emergency room because of severe wheezing and desaturation.  Please 

refer to my previous dictation concerning details of current illness, past 

medical history, personal and social history, family history, review of 

systems, physical examination, initial laboratory studies.



HOSPITAL COURSE:  Initially, the patient was fighting the BiPAP machine and 

could not wear it, saturation dropped quickly to the 70s.  Subsequently, 

she was able to __________ to wear her BiPAP machine and gradually things 

stabilized.  She was given bronchodilator treatment.  Pulmonary 

consultation was obtained with Dr. Davis.  Over the weekend, the patient 

was treated by Dr. Up and Dr. Davis, gradually improved; however, she 

developed sudden onset of atrial fibrillation, was treated with sotalol 80 

mg b.i.d., this caused significant bradycardia, but she did convert to a 

sinus rhythm.  When examined later, she was still wheezing.  We decided to 

change the sotalol to amiodarone because of the beta-blocking effect and 

wheezing, which may be exacerbated by sotalol.  The patient, however, 

particularly at the urging of the daughter wanted to sign out against 

medical advice.  They understand the danger of having such degree of 

bradycardia on sotalol and not able to switch over to amiodarone and not 

able to treat the patient with anticoagulants.  There is significant risk 

of cardiac arrest as well as CVA.  Nevertheless, fully understanding these 

risks, the family and patient signed out against medical advice.  

Apparently, they had wanted to be discharged throughout the weekend after I 

talked with Dr. Up and obtained this additional information.  The 

patient will presumably follow with primary care physician, will see me as 

needed.



DISCHARGE DIAGNOSES:

1. Chronic obstructive pulmonary disease, possibly due to cooking exposure 

in the past, possible bronchitis, possible adverse reaction to Micardis.

2. Hypoxemia with chest x-ray showing some congestion, but with normal left 

ventricular function and without significant computerized tomography of the 

chest findings.

3. Possible underlying coronary artery disease by computerized tomography 

scan.

4. Possible gastroesophageal reflux.

5. Atrial fibrillation, possibly exacerbated by albuterol.

6. Severe sinus bradycardia, possibly due to sotalol, which may also 

exacerbate wheezing.

7. The patient was switched to amiodarone, but signed out against medical 

advice.

8. Consider anticoagulation for atrial fibrillation.

9. Hypertension.

10. Obesity.

 



 _________________________________

CHAPIS BELCHER MD



DD:  04/23/2018 19:24

DT:  04/24/2018 00:49

Job#:  [id number] 





cc:MOSES BOYER MD